# Patient Record
Sex: FEMALE | NOT HISPANIC OR LATINO | URBAN - NONMETROPOLITAN AREA
[De-identification: names, ages, dates, MRNs, and addresses within clinical notes are randomized per-mention and may not be internally consistent; named-entity substitution may affect disease eponyms.]

---

## 2020-12-11 ENCOUNTER — APPOINTMENT (RX ONLY)
Dept: URBAN - NONMETROPOLITAN AREA CLINIC 35 | Facility: CLINIC | Age: 30
Setting detail: DERMATOLOGY
End: 2020-12-11

## 2020-12-11 DIAGNOSIS — L98.8 OTHER SPECIFIED DISORDERS OF THE SKIN AND SUBCUTANEOUS TISSUE: ICD-10-CM

## 2020-12-11 DIAGNOSIS — L70.0 ACNE VULGARIS: ICD-10-CM

## 2020-12-11 DIAGNOSIS — D22 MELANOCYTIC NEVI: ICD-10-CM

## 2020-12-11 DIAGNOSIS — L81.9 DISORDER OF PIGMENTATION, UNSPECIFIED: ICD-10-CM

## 2020-12-11 PROBLEM — D22.39 MELANOCYTIC NEVI OF OTHER PARTS OF FACE: Status: ACTIVE | Noted: 2020-12-11

## 2020-12-11 PROCEDURE — ? CHEMICAL PEEL JESSNER/TCA

## 2020-12-11 PROCEDURE — 99201: CPT

## 2020-12-11 PROCEDURE — ? IN-HOUSE DISPENSING PHARMACY

## 2020-12-11 PROCEDURE — ? COUNSELING

## 2020-12-11 PROCEDURE — ? TREATMENT REGIMEN

## 2020-12-11 ASSESSMENT — LOCATION SIMPLE DESCRIPTION DERM
LOCATION SIMPLE: LEFT CHEEK
LOCATION SIMPLE: RIGHT TEMPLE
LOCATION SIMPLE: RIGHT CHEEK
LOCATION SIMPLE: LEFT TEMPLE

## 2020-12-11 ASSESSMENT — LOCATION DETAILED DESCRIPTION DERM
LOCATION DETAILED: LEFT INFERIOR CENTRAL MALAR CHEEK
LOCATION DETAILED: LEFT MEDIAL BUCCAL CHEEK
LOCATION DETAILED: LEFT INFERIOR TEMPLE
LOCATION DETAILED: RIGHT INFERIOR TEMPLE
LOCATION DETAILED: RIGHT INFERIOR CENTRAL MALAR CHEEK

## 2020-12-11 ASSESSMENT — SEVERITY ASSESSMENT OVERALL AMONG ALL PATIENTS
IN YOUR EXPERIENCE, AMONG ALL PATIENTS YOU HAVE SEEN WITH THIS CONDITION, HOW SEVERE IS THIS PATIENT'S CONDITION?: FEW INFLAMMATORY LESIONS, SOME NONINFLAMMATORY

## 2020-12-11 ASSESSMENT — LOCATION ZONE DERM: LOCATION ZONE: FACE

## 2020-12-11 NOTE — PROCEDURE: IN-HOUSE DISPENSING PHARMACY
Product 28 Price/Unit (In Dollars): 0
Product 79 Unit Type: mg
Product 7 Amount/Unit (Numbers Only): 30
Render Refills If Set To 0: Yes
Product 13 Refills: 5
Product 16 Price/Unit (In Dollars): 42.00
Name Of Product 4: Alopecia Topical Solution For Men HP\\nFinasteride 0.1%/Minoxidil 7%
Product 21 Amount/Unit (Numbers Only): 120
Name Of Product 3: BP 5% w/ Tret 0.025% Gel\\nBenzoyl Peroxide 5%/Niacinamide 2%/Tretinoin 0.025%
Product 24 Price/Unit (In Dollars): 45.00
Product 24 Unit Type: ml
Product 26 Price/Unit (In Dollars): 53
Product 5 Application Directions: Apply topically to the affected areas one time daily
Product 17 Refills: 11
Product 2 Price/Unit (In Dollars): 48.00
Product 8 Application Directions: Apply to AA BID 3 weeks on, 1 week off
Name Of Product 11: Mometasone 0.1% w/ HA\\nHyaluronic Acid 2%/Mometasone Furoate 0.1% /Niacinamide 4%
Product 13 Application Directions: Apply 1-2 pumps topically to affected areas two times daily Friday-Sunday
Product 6 Amount/Unit (Numbers Only): 15
Product 10 Refills: 2
Detail Level: Zone
Product 20 Application Directions: Apply topically to face QHS as tolerated
Product 26 Application Directions: Apply a thin layer to face QHS for up to 8 weeks.  After 8 weeks break for 4 weeks before resuming.
Product 51 Application Directions: Apply to chest once daily.
Product 14 Application Directions: Apply 1-2 pumps topically to the affected areas on body twice daily until clear
Product 5 Amount/Unit (Numbers Only): 60
Name Of Product 26: Melasma Emulsion with Vitamin E
Product 15 Application Directions: Apply 1-2 pumps topically to the face once daily
Name Of Product 10: Desox 0.025% Ointment\\nDesoximatasone 0.25%/Niacinamide 4%
Product 22 Price/Unit (In Dollars): 53.00
Product 18 Application Directions: Apply 1-2 pumps topically to affected area every morning.
Name Of Product 25: Adapalene Triple Combo Acne gel
Name Of Product 23: Latisse
Name Of Product 5: Alopecia Topical Solution For Women\\nMinoxidil 7%/Progesterone 0.1%
Product 24 Units Dispensed: 1
Product 61 Application Directions: Apply once daily to AA at bedtime for 3 weeks.  Break for 1 week. Repeat this cycle.
Product 22 Application Directions: Apply 1-2 pumps topically to the affected area at night 3 x weekly x 8 weeks. Washing off in AM.
Product 10 Application Directions: Apply 1-2 pumps topically to the affected areas on legs twice daily for 3 weeks, breaking 1 week before repeating again
Name Of Product 14: Hydrocort 2.5%/Keto 2% Fungal Cream \\nHydrocortisone 2.5%/Ketoconazole 2%
Product 1 Application Directions: Apply a thin layer to face QAM.
Product 18 Price/Unit (In Dollars): 40.00
Product 6 Application Directions: Apply topically to the affected nail(s) and surrounding areas one time daily. May precipitate.
Name Of Product 17: Scar Silicone Gel \\nPentoxifylline 0.5%/Tranilast 1%/Triamcinolone Acetonide 0.1%/Zinc Oxide 5%
Name Of Product 13: Tacro 0.1% Ointment\\nNiacinamide 4%/Tacrolimus 0.1%
Name Of Product 2: Dapsone 6%/Niacinamide 2%/Spironolactone 5%
Name Of Product 6: Anti-Fungal Nail Solution \\nCiclopirox 8%/Fluconazole 1%/Terbinafine 1%
Name Of Product 12: Anti-Fungal Keto 2% Shampoo\\nKetoconazole 2%/Zinc Pyrithione 1%/Salicylic Acid 2%
Name Of Product 18: Wart Gel \\nCimetidine 5%/Ibuprofen 2%/Salicylic Acid 17%
Product 4 Application Directions: Apply to scalp daily
Name Of Product 15: Metron 1% Rosacea Gel \\nMetronidazole 1%/Niacinamide 4%
Name Of Product 20: Tret 0.05% w/ HA Cream\\nHyaluronic Acid 0.5%/Niacinamide 4%/Tretinoin 0.05%
Product 2 Application Directions: Apply 1-2 pumps topically to face every morning
Product 23 Application Directions: Apply to upper lash line nightly
Name Of Product 61: CLOB 0.05% TOPICAL SOLUTION- CLOBETASOL PROPIONATE 0.05% NIACINAMIDE 4% 60mL
Name Of Product 19: Bruise Healing Cream \\nArnica 2%/Ascorbic Acid 20%/Bromelain 5% /Niacinamide 4%/Phytonadione 1%
Product 7 Application Directions: Apply 1-2 pumps topically to affected areas twice daily
Name Of Product 21: Acne Rosacea Face & Body Cleanser \\nSodium Sulfacetamide Monohydrate 8%/Sulfur Precipitated 4%/Salicylic Acid 2%
Name Of Product 9: Clob 0.05% Ointment \\nClobetasol Propionate 0.05%/Niacinamide 4%
Name Of Product 16: Ivermec 1% / Metron 1% Rosacea Gel \\nIvermectin 1%/Metronidazole 1%/Niacinamide 4% /Potassium Azeloyl Diglycinate 5%
Name Of Product 7: Anti-Fungal Econaz 1% Cream\\nEconazole Nitrate 1%/Niacinamide 4%
Product 21 Application Directions: Wet skin and apply to the affected areas. Massage gently for 10 seconds, working into a full lather. Rinse well and pat dry. 1-2 x daily.
Name Of Product 51: TRET 0.05% w/ HA-\\nHYALURONIC ACID 0.5% NIACINAMIDE 4% TRETINOIN 0.05% 30mL
Name Of Product 24: Azelaic Acid Cream\\nAzelaic Acid 15%/Niacinamide 4%
Name Of Product 22: AK Imiquim 5% / Levocetir 1% Gel \\nImiquimod 5%/Levocetirizine 1%/Niacinamide 2%
Product 9 Application Directions: Apply 1-2 pumps topically to affected areas twice daily x 3 weeks, breaking 1 week off before repeating again
Product 25 Application Directions: Apply to face QHS as tolerated
Name Of Product 1: Dap 6% Acne Gel\\nDapsone 6%/Niacinamide 4%
Product 17 Application Directions: Apply 1-2 pumps topically to the affected areas BID
Product 24 Application Directions: Apply 1-2 pumps topically to face BID
Name Of Product 8: Clob 0.05% Topical Solution \\nClobetasol Propionate 0.05%/Niacinamide 4%
Product 12 Application Directions: Wash the affected area(s) once daily when flared. Twice weekly for maintenance.
Product 9 Unit Type: grams

## 2020-12-11 NOTE — PROCEDURE: COUNSELING
Detail Level: Simple
Topical Retinoid counseling:  Patient advised to apply a pea-sized amount only at bedtime and wait 30 minutes after washing their face before applying.  If too drying, patient may add a non-comedogenic moisturizer. The patient verbalized understanding of the proper use and possible adverse effects of retinoids.  All of the patient's questions and concerns were addressed.
Birth Control Pills Pregnancy And Lactation Text: This medication should be avoided if pregnant and for the first 30 days post-partum.
Birth Control Pills Counseling: Birth Control Pill Counseling: I discussed with the patient the potential side effects of OCPs including but not limited to increased risk of stroke, heart attack, thrombophlebitis, deep venous thrombosis, hepatic adenomas, breast changes, GI upset, headaches, and depression.  The patient verbalized understanding of the proper use and possible adverse effects of OCPs. All of the patient's questions and concerns were addressed.
Tazorac Counseling:  Patient advised that medication is irritating and drying.  Patient may need to apply sparingly and wash off after an hour before eventually leaving it on overnight.  The patient verbalized understanding of the proper use and possible adverse effects of tazorac.  All of the patient's questions and concerns were addressed.
Topical Retinoid Pregnancy And Lactation Text: This medication is Pregnancy Category C. It is unknown if this medication is excreted in breast milk.
Azithromycin Pregnancy And Lactation Text: This medication is considered safe during pregnancy and is also secreted in breast milk.
Patient Specific Counseling (Will Not Stick From Patient To Patient): I recommended daily application of moisturizing eye cream. \\nWe discussed 6-8 units of Botox per side.
Benzoyl Peroxide Counseling: Patient counseled that medicine may cause skin irritation and bleach clothing.  In the event of skin irritation, the patient was advised to reduce the amount of the drug applied or use it less frequently.   The patient verbalized understanding of the proper use and possible adverse effects of benzoyl peroxide.  All of the patient's questions and concerns were addressed.
Erythromycin Pregnancy And Lactation Text: This medication is Pregnancy Category B and is considered safe during pregnancy. It is also excreted in breast milk.
Tetracycline Pregnancy And Lactation Text: This medication is Pregnancy Category D and not consider safe during pregnancy. It is also excreted in breast milk.
Doxycycline Pregnancy And Lactation Text: This medication is Pregnancy Category D and not consider safe during pregnancy. It is also excreted in breast milk but is considered safe for shorter treatment courses.
Sarecycline Counseling: Patient advised regarding possible photosensitivity and discoloration of the teeth, skin, lips, tongue and gums.  Patient instructed to avoid sunlight, if possible.  When exposed to sunlight, patients should wear protective clothing, sunglasses, and sunscreen.  The patient was instructed to call the office immediately if the following severe adverse effects occur:  hearing changes, easy bruising/bleeding, severe headache, or vision changes.  The patient verbalized understanding of the proper use and possible adverse effects of sarecycline.  All of the patient's questions and concerns were addressed.
Spironolactone Counseling: Patient advised regarding risks of diarrhea, abdominal pain, hyperkalemia, birth defects (for female patients), liver toxicity and renal toxicity. The patient may need blood work to monitor liver and kidney function and potassium levels while on therapy. The patient verbalized understanding of the proper use and possible adverse effects of spironolactone.  All of the patient's questions and concerns were addressed.
Tazorac Pregnancy And Lactation Text: This medication is not safe during pregnancy. It is unknown if this medication is excreted in breast milk.
Isotretinoin Counseling: Patient should get monthly blood tests, not donate blood, not drive at night if vision affected, not share medication, and not undergo elective surgery for 6 months after tx completed. Side effects reviewed, pt to contact office should one occur.
Bactrim Pregnancy And Lactation Text: This medication is Pregnancy Category D and is known to cause fetal risk.  It is also excreted in breast milk.
Topical Clindamycin Counseling: Patient counseled that this medication may cause skin irritation or allergic reactions.  In the event of skin irritation, the patient was advised to reduce the amount of the drug applied or use it less frequently.   The patient verbalized understanding of the proper use and possible adverse effects of clindamycin.  All of the patient's questions and concerns were addressed.
Detail Level: Zone
Tetracycline Counseling: Patient counseled regarding possible photosensitivity and increased risk for sunburn.  Patient instructed to avoid sunlight, if possible.  When exposed to sunlight, patients should wear protective clothing, sunglasses, and sunscreen.  The patient was instructed to call the office immediately if the following severe adverse effects occur:  hearing changes, easy bruising/bleeding, severe headache, or vision changes.  The patient verbalized understanding of the proper use and possible adverse effects of tetracycline.  All of the patient's questions and concerns were addressed. Patient understands to avoid pregnancy while on therapy due to potential birth defects.
High Dose Vitamin A Pregnancy And Lactation Text: High dose vitamin A therapy is contraindicated during pregnancy and breast feeding.
Topical Sulfur Applications Counseling: Topical Sulfur Counseling: Patient counseled that this medication may cause skin irritation or allergic reactions.  In the event of skin irritation, the patient was advised to reduce the amount of the drug applied or use it less frequently.   The patient verbalized understanding of the proper use and possible adverse effects of topical sulfur application.  All of the patient's questions and concerns were addressed.
Include Pregnancy/Lactation Warning?: No
Topical Sulfur Applications Pregnancy And Lactation Text: This medication is Pregnancy Category C and has an unknown safety profile during pregnancy. It is unknown if this topical medication is excreted in breast milk.
Topical Clindamycin Pregnancy And Lactation Text: This medication is Pregnancy Category B and is considered safe during pregnancy. It is unknown if it is excreted in breast milk.
Bactrim Counseling:  I discussed with the patient the risks of sulfa antibiotics including but not limited to GI upset, allergic reaction, drug rash, diarrhea, dizziness, photosensitivity, and yeast infections.  Rarely, more serious reactions can occur including but not limited to aplastic anemia, agranulocytosis, methemoglobinemia, blood dyscrasias, liver or kidney failure, lung infiltrates or desquamative/blistering drug rashes.
Dapsone Counseling: I discussed with the patient the risks of dapsone including but not limited to hemolytic anemia, agranulocytosis, rashes, methemoglobinemia, kidney failure, peripheral neuropathy, headaches, GI upset, and liver toxicity.  Patients who start dapsone require monitoring including baseline LFTs and weekly CBCs for the first month, then every month thereafter.  The patient verbalized understanding of the proper use and possible adverse effects of dapsone.  All of the patient's questions and concerns were addressed.
Dapsone Pregnancy And Lactation Text: This medication is Pregnancy Category C and is not considered safe during pregnancy or breast feeding.
Azithromycin Counseling:  I discussed with the patient the risks of azithromycin including but not limited to GI upset, allergic reaction, drug rash, diarrhea, and yeast infections.
Doxycycline Counseling:  Patient counseled regarding possible photosensitivity and increased risk for sunburn.  Patient instructed to avoid sunlight, if possible.  When exposed to sunlight, patients should wear protective clothing, sunglasses, and sunscreen.  The patient was instructed to call the office immediately if the following severe adverse effects occur:  hearing changes, easy bruising/bleeding, severe headache, or vision changes.  The patient verbalized understanding of the proper use and possible adverse effects of doxycycline.  All of the patient's questions and concerns were addressed.
Isotretinoin Pregnancy And Lactation Text: This medication is Pregnancy Category X and is considered extremely dangerous during pregnancy. It is unknown if it is excreted in breast milk.
Minocycline Counseling: Patient advised regarding possible photosensitivity and discoloration of the teeth, skin, lips, tongue and gums.  Patient instructed to avoid sunlight, if possible.  When exposed to sunlight, patients should wear protective clothing, sunglasses, and sunscreen.  The patient was instructed to call the office immediately if the following severe adverse effects occur:  hearing changes, easy bruising/bleeding, severe headache, or vision changes.  The patient verbalized understanding of the proper use and possible adverse effects of minocycline.  All of the patient's questions and concerns were addressed.
High Dose Vitamin A Counseling: Side effects reviewed, pt to contact office should one occur.
Spironolactone Pregnancy And Lactation Text: This medication can cause feminization of the male fetus and should be avoided during pregnancy. The active metabolite is also found in breast milk.
Erythromycin Counseling:  I discussed with the patient the risks of erythromycin including but not limited to GI upset, allergic reaction, drug rash, diarrhea, increase in liver enzymes, and yeast infections.
Benzoyl Peroxide Pregnancy And Lactation Text: This medication is Pregnancy Category C. It is unknown if benzoyl peroxide is excreted in breast milk.

## 2020-12-11 NOTE — PROCEDURE: TREATMENT REGIMEN
Initiate Treatment: Wash face with a gentle cleanser, apply Azalic acid, then moisturize with a non-comedogenic moisturizer BID
Detail Level: Zone
Plan is for patient will complete a series of 3-5 monthly Jessner's peels.  She knows to stop Azaleic acid for 7 days following peel
Plan: Jessner's peels: series of 3-5 peels spaced at monthly intervals. Patient advised to stop Azaleic acid for 7 days after peel
Continue Regimen: Daily SPF with frequent reapplication when outdoors
Initiate Treatment: Azaleic acid cream BID

## 2020-12-11 NOTE — PROCEDURE: CHEMICAL PEEL JESSNER/TCA
Erythema: mild
Number Of Coats: 2
Time (Mins): 5
Frost (0,1+,2+,3+,4+): 1+
Prep: The treated area was degreased with pre-peel cleanser, and vaseline was applied for protection of mucous membranes.
Detail Level: Zone
Treatment Number: 0
Post Peel Care: After the procedure a post-peel cream was applied. Sun protection and post-care instructions were reviewed with the patient.
Chemical Peel: Jessners
Consent: Prior to the procedure, written consent was obtained and risks were reviewed, including but not limited to: redness, peeling, blistering, pigmentary change, scarring, infection, and pain.
Post-Care Instructions: I reviewed with the patient in detail post-care instructions. Patient should avoid sun exposure and wear sun protection.

## 2020-12-11 NOTE — HPI: COSMETIC CONSULTATION
Additional History: Pt has never had Botox but is interested in treating her wrinkles under her eyes and under her nose.

## 2020-12-11 NOTE — HPI: PIMPLES (ACNE)
How Severe Is Your Acne?: mild
Is This A New Presentation, Or A Follow-Up?: Acne
Additional Comments (Use Complete Sentences): [facility] skin care line.

## 2020-12-28 ENCOUNTER — APPOINTMENT (RX ONLY)
Dept: URBAN - NONMETROPOLITAN AREA CLINIC 35 | Facility: CLINIC | Age: 30
Setting detail: DERMATOLOGY
End: 2020-12-28

## 2020-12-28 DIAGNOSIS — Z41.9 ENCOUNTER FOR PROCEDURE FOR PURPOSES OTHER THAN REMEDYING HEALTH STATE, UNSPECIFIED: ICD-10-CM

## 2020-12-28 PROCEDURE — ? JESSNER PEEL

## 2020-12-28 ASSESSMENT — LOCATION DETAILED DESCRIPTION DERM: LOCATION DETAILED: SUPERIOR MID FOREHEAD

## 2020-12-28 ASSESSMENT — LOCATION ZONE DERM: LOCATION ZONE: FACE

## 2020-12-28 ASSESSMENT — LOCATION SIMPLE DESCRIPTION DERM: LOCATION SIMPLE: SUPERIOR FOREHEAD

## 2020-12-28 NOTE — PROCEDURE: JESSNER PEEL
Price (Use Numbers Only, No Special Characters Or $): 130
Post Peel Care: After the JR was applied, detailed post-care instructions were reviewed.
Post-Care Instructions: I reviewed with the patient in detail post-care instructions. Patient should avoid sun exposure and wear sun protection.
Treatment Number: 2
Consent: Prior to the procedure, written consent was obtained and risks were reviewed, including but not limited to: redness, peeling, blistering, pigmentary change, scarring, infection, and pain.
Detail Level: Zone
Treatment Time (Optional): Total of 2 passes and peel was left on for 2.5 minutes per pass
Chemical Peel: Jessner Peel
Prep: The treated area was degreased with pre-peel cleanser, and vaseline was applied for protection of mucous membranes.

## 2021-01-12 ENCOUNTER — APPOINTMENT (RX ONLY)
Dept: URBAN - NONMETROPOLITAN AREA CLINIC 35 | Facility: CLINIC | Age: 31
Setting detail: DERMATOLOGY
End: 2021-01-12

## 2021-01-12 DIAGNOSIS — L70.0 ACNE VULGARIS: ICD-10-CM

## 2021-01-12 DIAGNOSIS — Z41.9 ENCOUNTER FOR PROCEDURE FOR PURPOSES OTHER THAN REMEDYING HEALTH STATE, UNSPECIFIED: ICD-10-CM

## 2021-01-12 DIAGNOSIS — L81.1 CHLOASMA: ICD-10-CM

## 2021-01-12 PROCEDURE — ? COUNSELING

## 2021-01-12 PROCEDURE — ? TREATMENT REGIMEN

## 2021-01-12 PROCEDURE — ? IN-HOUSE DISPENSING PHARMACY

## 2021-01-12 PROCEDURE — 99213 OFFICE O/P EST LOW 20 MIN: CPT

## 2021-01-12 PROCEDURE — ? BOTOX

## 2021-01-12 PROCEDURE — ? COSMETIC CONSULTATION: CHEMICAL PEELS

## 2021-01-12 PROCEDURE — ? COSMETIC CONSULTATION: LASER RESURFACING

## 2021-01-12 ASSESSMENT — LOCATION DETAILED DESCRIPTION DERM
LOCATION DETAILED: INFERIOR MID FOREHEAD
LOCATION DETAILED: RIGHT INFERIOR TEMPLE
LOCATION DETAILED: LEFT INFERIOR TEMPLE

## 2021-01-12 ASSESSMENT — LOCATION SIMPLE DESCRIPTION DERM
LOCATION SIMPLE: INFERIOR FOREHEAD
LOCATION SIMPLE: RIGHT TEMPLE
LOCATION SIMPLE: LEFT TEMPLE

## 2021-01-12 ASSESSMENT — LOCATION ZONE DERM: LOCATION ZONE: FACE

## 2021-01-12 NOTE — PROCEDURE: BOTOX
Additional Area 3 Location: GROVER Carlson
Show Mentalis Units: No
Show Glabellar Units: Yes
Additional Area 1 Location: forehead, glabella
Levator Labii Superioris Units: 0
Lot #: [id number]
Additional Area 2 Units: 6
Additional Area 5 Location: upper lip
Additional Area 6 Location: Brow Lift
Consent obtained. Risks include but not limited to lid/brow ptosis, bruising, swelling, diplopia, temporary effect, incomplete chemical denervation.
Dilution (U/0.1 Cc): 4
Expiration Date (Month Year): 09/2023
Detail Level: Simple
Additional Area 2 Location: SAUL abdul
Additional Area 4 Location: Medial Brows

## 2021-01-12 NOTE — PROCEDURE: IN-HOUSE DISPENSING PHARMACY
Name Of Product 71: MELASMA EMULSION W/VITAMIN E- HYDROCORTISONE 0.5% HYDROQUINONE 4% KOJIC ACID 6% TRETINOIN 0.025%
Product 14 Unit Type: ml
Name Of Product 33: Dermatitis Foam
Product 79 Unit Type: mg
Product 53 Amount/Unit (Numbers Only): 0
Product 16 Application Directions: Apply 1-2 pumps topically to affected areas on face BID as tolerated
Product 2 Refills: 7
Product 29 Application Directions: AAA at diaper change
Name Of Product 6: Alopecia Topical Solution for Women HP\\nMinoxidil 7%/Progesterone 0.1%
Product 3 Amount/Unit (Numbers Only): 30
Product 9 Al/Unit (In Dollars): 42.00
Product 11 Price/Unit (In Dollars): 48.00
Product 1 Units Dispensed: 1
Product 24 Amount/Unit (Numbers Only): 120
Product 19 Refills: 11
Name Of Product 29: Zinc oxide
Render Product Pricing In Note: Yes
Name Of Product 2: Dap 6% Acne Gel\\nDapsone 6%/Niacinamide 4%
Product 24 Price/Unit (In Dollars): 58.00
Product 18 Price/Unit (In Dollars): 45.00
Name Of Product 26: Triple Combination Acne Gel\\nTretinoin 0.025%/Clindamycin 1%/Benzoyl Peroxide 2.5%
Product 30 Price/Unit (In Dollars): 52.00
Product 9 Application Directions: Apply 1-2 pumps topically to affected area twice daily up to 3 weeks at a time, breaking 1 week before repeating again as needed.
Product 3 Refills: 5
Product 42 Refills: 3
Product 3 Application Directions: Apply 1-2 pumps topically to face every morning
Product 21 Application Directions: Apply pea sized amount to entire face QHS as tolerated
Name Of Product 32: Mupirocin ointment
Product 71 Price/Unit (In Dollars): 50
Product 6 Amount/Unit (Numbers Only): 60
Name Of Product 52: ACNE DEFENSE- CLARIFYING CLEANSER
Product 17 Refills: 10
Product 2 Application Directions: Apply 1-2 pumps topically to the face every morning
Name Of Product 73: TRET 0.05% w/ HA-\\nHYALURONIC ACID 0.5% NIACINAMIDE 4% TRETINOIN 0.05% 30mL
Name Of Product 18: Scar Silicone Gel\\nPentoxifylline 0.5%/Tranilast 1%/Triamcinolone Acetonide 0.1%/Zinc Oxide 5%
Product 15 Unit Type: bottle(s)
Product 11 Application Directions: Apply 1-2 pumps topically to affected area 4xdaily prn
Detail Level: Simple
Product 74 Application Directions: Apply sparingly to AA for 1-2 days as needed.
Product 8 Application Directions: Apply bid
Product 28 Application Directions: AAA BID until clear.
Product 52 Application Directions: Wash AA Once Daily.
Product 7 Application Directions: Apply to affected toenails 1-2 times daily
Product 25 Application Directions: Apply 1-2 pumps topically to the face twice daily.
Product 14 Application Directions: Apply 1-2 pumps topically to affected areas twice daily.
Product 73 Application Directions: Apply topically once daily.
Product 72 Application Directions: Apply one to two pumps topically to AA for up to two weeks until reaction occurs.
Name Of Product 16: Metron 1% Rosacea Gel\\nMetronidazole 1%/Niacinamide 4%
Product 9 Unit Type: grams
Product 4 Application Directions: Apply 1-2 pumps topically to the face nightly or as tolerated
Product 4 Refills: 6
Product 30 Unit Type: jar(s)
Name Of Product 5: Alopecia Topical Solution for Men HP\\nFinasteride 0.1%/Minoxidil 7%
Name Of Product 4: BP 5% w/ Tret 0.025% Gel\\nBenzoyl Peroxide 5%/Niacinamide 2%/Tretinoin 0.025%
Product 52 Price/Unit (In Dollars): 26.00
Name Of Product 23: AK Imiquim 5%/Levocetir 1%\\nImiquimod 5%/Levocetirizine 1%/Niacinamide 2%
Name Of Product 7: Anti-Fungal Nail Solution\\nCiclopirox 8%/Fluconazole 1%/Terbinafine 1%
Product 13 Application Directions: Wash the affected areas on face and scalp every other day in shower. Let sit for several minutes before rinsing.
Product 71 Refills: 2
Product 7 Price/Unit (In Dollars): 40.00
Product 12 Application Directions: Apply 1-2 pumps topically to affected areas twice daily for up to 1 week
Name Of Product 11: Desox 0.025% Ointment\\nDesoximatasone 0.25%/Niacinamide 4%
Product 15 Application Directions: AAA on face up to 3-4 times per week PRN.
Name Of Product 10: Clob 0.05% Ointment\\nClobetasol Propionate 0.05%/Niacinamide 4%
Name Of Product 28: Mupirocin and lidocaine ointment
Name Of Product 14: Tacro 0.1% Ointment\\nNiacinamide 4%/Tacrolimus 0.1%
Name Of Product 25: Azelaic Acid Cream\\nAzelaic Acid 15%/Niacinamide 4%
Product 23 Application Directions: Apply topically to affected areas on back BID x 2-3
Product 7 Refills: 4
Product 14 Al/Unit (In Dollars): 53.00
Product 18 Application Directions: Apply 1-2 pumps topically to affected area once daily x 2 weeks. Break 1 week off before repeating.
Name Of Product 20: Bruise Healing Cream\\nArnica 2%/Ascorbic Acid 20%/Bromelain 5%/Niacinamide 4%/Phytonadione 1%
Product 32 Price/Unit (In Dollars): 42.0
Name Of Product 21: Tret 0.05% w/ HA gel\\nHyaluronic Acid 0.5%/Niacinamide 4%/Tretinoin 0.05%
Product 26 Application Directions: Apply pea-sized amount to affected areas QHS
Name Of Product 72: AK IMIQUIM 5% LEVOCETIR 1% GEL-\\nIMIQUIMOD 5% LEVOCETIRIZINE 1% NIACINAMIDE 2% 30mL
Name Of Product 30: Anti fungal Nail solution
Name Of Product 31: Nail solution
Name Of Product 13: Anti-Fungal Keto 2% Shampoo\\nKetoconazole 2%/Zinc Pyrithione 1%/Salicylic Acid 2%
Product 19 Application Directions: Apply 1-2 pumps topically to affected area nightly, covering with tape, and washing off in AM.
Name Of Product 12: Mometasone 0.1% w/ HA\\nHyaluronic Acid 2%/Mometasone Furoate 0.1%/Niacinamide 4%
Product 7 Amount/Unit (Numbers Only): 15
Name Of Product 3: Alli 5% w/ Dap 6% Gel\\nDapsone 6%/Niacinamide 2%/Spironolactone 5%
Product 62 Application Directions: APply pea sized amount to AA 2-7 nights per weeks as tolerated.
Name Of Product 42: DERMATITIS FOAM 60ML  CALCIPOTRIENE 0.005% CLOBETASOL PROPIONATE 0.05%
Product 10 Application Directions: Apply 1-2 pumps topically to backs of hands 2x daily x1-2 weeks. Avoid face, armpits, and groin.
Name Of Product 27: BP 5% w/ Tret 0.025% Gel
Product 41 Application Directions: AAA once daily.
Product 6 Application Directions: Apply topically to the affected areas nightly
Name Of Product 8: Anti-Fungal Econaz 1% Cream\\nEconazole Nitrate 1%/Niacinamide 4%
Name Of Product 24: Triamcinolone Cream\\nTriamcinolone acetonide 0.1%/Niacinamide 4%
Name Of Product 15: Hydrocort 2.5%/Keto 2% Fungal Cream\\nHydrocortisone 2.5%/Ketoconazole 2%
Name Of Product 41: HYDROCORT 2.5% KETO 2% FUNGAL CREAM- HYDROCORTISONE 2.5% KETOCONAZOLE 2% 30mL
Name Of Product 17: Ivermec 1%/Metron 1% Rosacea Gel\\nIvermectin 1%/Metronidazole 1%/Niacinamide 4%/Potassium Azeloyl Diglycinate 5%
Product 22 Application Directions: Use as cleanser qd-bid
Name Of Product 61: ANTI-FUNGAL ECONAZ 1% CREAM- ECONAZOLE NITRATE 1% NIACINAMIDE 4% 30mL
Name Of Product 1: Melasma Emulsion
Product 27 Application Directions: Use nightly as tolerated.
Product 5 Application Directions: Apply topically to scalp nightly.
Product 17 Application Directions: Apply 1-2 pumps topically to affected areas once daily
Name Of Product 62: ADAPALENE TRIPLE COMBINATION ACNE GEL  ADAPALENE.03% CLINDAMYCIN 1% BENZOYL PEROXIDE 2.5%
Product 1 Application Directions: apply pea sized amount to dark spots on face daily
Name Of Product 22: Acne Rosacea Face & Body Cleanser\\nSodium Sulfacetamide 8%/Sulfur 4%
Product 27 Al/Unit (In Dollars): 42
Name Of Product 19: Wart Gel\\nCimetidine 5%/Ibuprofen 2%/Salicylic Acid 17%
Product 24 Application Directions: Apply topically to the affected areas twice daily x 2 weeks. Break 1 week off before repeating again as needed
Name Of Product 9: Clob 0.05% Topical Solution\\nClobetasol Propionate 0.05%/Niacinamide 4%
Name Of Product 74: DESOX 0.025% OINTMENT- DESOXIMATASONE 0.25% NIACINAMIDE 4% 30mL

## 2021-01-12 NOTE — PROCEDURE: TREATMENT REGIMEN
Detail Level: Simple
Plan: Consider laser treatments, photorejuvenation, more aggressive chemical peel.\\nTopical cleansers, bleach creams,

## 2021-01-12 NOTE — HPI: COSMETIC CONSULTATION
Additional History: Pt has no history of myasthenia gravis, no history of multiple sclerosis, no history of autoimmune disease, not currently breast feeding, no history of HBV, no history of HCV, no HIV infection, and not currently pregnant or trying to become pregnant.

## 2021-05-25 ENCOUNTER — APPOINTMENT (RX ONLY)
Dept: URBAN - NONMETROPOLITAN AREA CLINIC 35 | Facility: CLINIC | Age: 31
Setting detail: DERMATOLOGY
End: 2021-05-25

## 2021-05-25 DIAGNOSIS — Z41.9 ENCOUNTER FOR PROCEDURE FOR PURPOSES OTHER THAN REMEDYING HEALTH STATE, UNSPECIFIED: ICD-10-CM

## 2021-05-25 PROCEDURE — ? JESSNER PEEL

## 2021-05-25 ASSESSMENT — LOCATION ZONE DERM: LOCATION ZONE: FACE

## 2021-05-25 ASSESSMENT — LOCATION SIMPLE DESCRIPTION DERM: LOCATION SIMPLE: RIGHT CHEEK

## 2021-05-25 ASSESSMENT — LOCATION DETAILED DESCRIPTION DERM: LOCATION DETAILED: RIGHT INFERIOR CENTRAL MALAR CHEEK

## 2021-05-25 NOTE — PROCEDURE: JESSNER PEEL
Expiration Date (Optional): 
Chemical Peel: Jessner Peel
Consent: Prior to the procedure, written consent was obtained and risks were reviewed, including but not limited to: redness, peeling, blistering, pigmentary change, scarring, infection, and pain.
Detail Level: Zone
Treatment Number: 0
Prep: The treated area was degreased with pre-peel cleanser, and vaseline was applied for protection of mucous membranes.
Treatment Time (Optional): 3 minutes  40 seconds
Post-Care Instructions: I reviewed with the patient in detail post-care instructions. Patient should avoid sun exposure and wear sun protection.
Post Peel Care: After the Pefect Peel was applied, detailed post-care instructions were reviewed.